# Patient Record
Sex: MALE | Race: WHITE
[De-identification: names, ages, dates, MRNs, and addresses within clinical notes are randomized per-mention and may not be internally consistent; named-entity substitution may affect disease eponyms.]

---

## 2019-11-26 ENCOUNTER — HOSPITAL ENCOUNTER (OUTPATIENT)
Dept: HOSPITAL 47 - RADXRMAIN | Age: 29
Discharge: HOME | End: 2019-11-26
Payer: COMMERCIAL

## 2019-11-26 DIAGNOSIS — S01.80XA: ICD-10-CM

## 2019-11-26 DIAGNOSIS — S13.4XXA: ICD-10-CM

## 2019-11-26 DIAGNOSIS — S00.83XA: Primary | ICD-10-CM

## 2019-11-26 PROCEDURE — 72050 X-RAY EXAM NECK SPINE 4/5VWS: CPT

## 2019-11-26 PROCEDURE — 70110 X-RAY EXAM OF JAW 4/> VIEWS: CPT

## 2019-11-26 NOTE — XR
EXAMINATION TYPE: XR cervical spine comp

 

DATE OF EXAM: 11/26/2019

 

COMPARISON: None

 

HISTORY: 28-year-old male left side of chin laceration, pain.  S00.83XA S01.80XA S13.4XXA

 

 

TECHNIQUE:  5 views.

 

FINDINGS:  

Minimal uncovertebral joint and facet spurring mid to lower cervical spine. No prevertebral soft tiss
ue swelling or predental space widening. Alignment is maintained. Disc spaces are also preserved. No 
significant bony neuroforaminal narrowing on either side. Normal odontoid view.

 

 

IMPRESSION:  

No prevertebral soft tissue swelling or malalignment. Very minimal facet and uncovertebral joint spur
ring mid to lower cervical spine.

## 2019-11-26 NOTE — XR
EXAMINATION TYPE: XR mandible complete

 

DATE OF EXAM: 11/26/2019

 

COMPARISON: NONE

 

HISTORY: S00.83XA S01.80XA S13.4XXA

 

 

TECHNIQUE: 5 views of the mandible are submitted.

 

FINDINGS: No evidence for mandibular fracture or osseous lesion. No radiopaque foreign body identifie
d.

 

IMPRESSION: Unremarkable study.

## 2023-05-15 ENCOUNTER — HOSPITAL ENCOUNTER (OUTPATIENT)
Dept: HOSPITAL 47 - RADCTMAIN | Age: 33
Discharge: HOME | End: 2023-05-15
Payer: COMMERCIAL

## 2023-05-15 DIAGNOSIS — J32.2: Primary | ICD-10-CM

## 2023-05-15 DIAGNOSIS — J32.0: ICD-10-CM

## 2023-05-15 PROCEDURE — 70486 CT MAXILLOFACIAL W/O DYE: CPT

## 2023-05-15 NOTE — CT
EXAMINATION TYPE: CT sinus wo con

 

DATE OF EXAM: 5/15/2023

 

COMPARISON: None

 

HISTORY: Chronic sinusitis

 

CT DLP: 725.2 mGycm.  Automated Exposure Control for Dose Reduction was Utilized.

 

TECHNIQUE: CT scan of the sinuses is performed without contrast, axial images are obtained, coronal r
eformatted images are also reviewed.

 

FINDINGS: 

 

There are a few scattered small mucous retention cysts in the ethmoid sinuses and mild mucosal thicke
ryan in the inferior aspects of the maxillary sinuses. There are no air-fluid levels to suggest acute
 sinusitis.

 

The ostiomeatal complexes are widely patent.

 

The nasal cavity is unremarkable.

 

The intraorbital contents appear normal and symmetric. The osseous structures are intact.

 

The mastoid air cells and middle ear cavities are well aerated.

 

IMPRESSION:

 

Mild chronic inflammatory changes in the maxillary and ethmoid sinuses. There is no evidence of acute
 sinusitis.